# Patient Record
(demographics unavailable — no encounter records)

---

## 2025-07-03 NOTE — HISTORY OF PRESENT ILLNESS
[Lower back] : lower back [7] : 7 [1] : 2 [Occasional] : occasional [de-identified] : Lower back pain.  week to 10 days.   [] : no [FreeTextEntry9] : patches [de-identified] : activity

## 2025-07-03 NOTE — ASSESSMENT
[FreeTextEntry1] : nonspecific LBP;  no red flags (symptoms, exam , imaging);  likely to be self limited;  should concentrate on core exercises and allow more time;  ultimately if it persists, worsens or develops neuro symptoms then we'll get an MRI;  The pain has been at the TL junction;  IF we need an MRI it should be a lumbar MRI.

## 2025-07-03 NOTE — PHYSICAL EXAM
[Normal Coordination] : normal coordination [Normal DTR UE/LE] : normal DTR UE/LE  [Normal Sensation] : normal sensation [Normal Mood and Affect] : normal mood and affect [Oriented] : oriented [Able to Communicate] : able to communicate [No obvious lymphadenopathy in areas examined] : no obvious lymphadenopathy in areas examined [Well Developed] : well developed [Extension] : extension [] : negative sitting straight leg raise